# Patient Record
Sex: MALE | Race: WHITE | NOT HISPANIC OR LATINO
[De-identification: names, ages, dates, MRNs, and addresses within clinical notes are randomized per-mention and may not be internally consistent; named-entity substitution may affect disease eponyms.]

---

## 2021-07-12 ENCOUNTER — APPOINTMENT (OUTPATIENT)
Dept: INTERNAL MEDICINE | Facility: CLINIC | Age: 21
End: 2021-07-12
Payer: COMMERCIAL

## 2021-07-12 ENCOUNTER — LABORATORY RESULT (OUTPATIENT)
Age: 21
End: 2021-07-12

## 2021-07-12 VITALS
DIASTOLIC BLOOD PRESSURE: 72 MMHG | HEIGHT: 71 IN | WEIGHT: 153 LBS | BODY MASS INDEX: 21.42 KG/M2 | SYSTOLIC BLOOD PRESSURE: 138 MMHG

## 2021-07-12 DIAGNOSIS — Z13.6 ENCOUNTER FOR SCREENING FOR CARDIOVASCULAR DISORDERS: ICD-10-CM

## 2021-07-12 DIAGNOSIS — Z00.00 ENCOUNTER FOR GENERAL ADULT MEDICAL EXAMINATION W/OUT ABNORMAL FINDINGS: ICD-10-CM

## 2021-07-12 PROCEDURE — 99395 PREV VISIT EST AGE 18-39: CPT | Mod: 25

## 2021-07-12 NOTE — REVIEW OF SYSTEMS
[Negative] : Heme/Lymph [Fever] : no fever [Chills] : no chills [Vision Problems] : no vision problems [Hearing Loss] : no hearing loss [Chest Pain] : no chest pain [Palpitations] : no palpitations [Shortness Of Breath] : no shortness of breath [Wheezing] : no wheezing [Dyspnea on Exertion] : not dyspnea on exertion [Abdominal Pain] : no abdominal pain [Muscle Weakness] : no muscle weakness [Joint Swelling] : no joint swelling [Headache] : no headache [Dizziness] : no dizziness [Fainting] : no fainting

## 2021-07-12 NOTE — HEALTH RISK ASSESSMENT
[No] : In the past 12 months have you used drugs other than those required for medical reasons? No [0] : 2) Feeling down, depressed, or hopeless: Not at all (0) [PHQ-2 Negative - No further assessment needed] : PHQ-2 Negative - No further assessment needed [] : No [MLL1Pommh] : 0

## 2021-07-12 NOTE — ASSESSMENT
[FreeTextEntry1] : His history and exam are unremarkable.\par Fasting labs are sent.\par His forms for participation were completed.

## 2021-07-12 NOTE — PHYSICAL EXAM
[No Acute Distress] : no acute distress [Clear to Auscultation] : lungs were clear to auscultation bilaterally [Normal] : normal rate, regular rhythm, normal S1 and S2 and no murmur heard [No Edema] : there was no peripheral edema [No Joint Swelling] : no joint swelling [No Focal Deficits] : no focal deficits [Normal Affect] : the affect was normal [Alert and Oriented x3] : oriented to person, place, and time

## 2021-07-12 NOTE — HISTORY OF PRESENT ILLNESS
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  Denies any recent illness and has been well.\par \par Denies any history of chest pain, palpitations, shortness of breath, dizziness or syncope.  He exercises regularly without any difficulty.

## 2021-07-13 ENCOUNTER — APPOINTMENT (OUTPATIENT)
Dept: INTERNAL MEDICINE | Facility: CLINIC | Age: 21
End: 2021-07-13
Payer: COMMERCIAL

## 2021-07-13 VITALS
TEMPERATURE: 98.8 F | HEIGHT: 71 IN | SYSTOLIC BLOOD PRESSURE: 124 MMHG | BODY MASS INDEX: 21.42 KG/M2 | WEIGHT: 153 LBS | DIASTOLIC BLOOD PRESSURE: 72 MMHG

## 2021-07-13 DIAGNOSIS — R50.9 FEVER, UNSPECIFIED: ICD-10-CM

## 2021-07-13 LAB
ALBUMIN SERPL ELPH-MCNC: 4.6 G/DL
ALP BLD-CCNC: 58 U/L
ALT SERPL-CCNC: 77 U/L
ANION GAP SERPL CALC-SCNC: 13 MMOL/L
AST SERPL-CCNC: 67 U/L
BILIRUB SERPL-MCNC: 0.7 MG/DL
BUN SERPL-MCNC: 13 MG/DL
CALCIUM SERPL-MCNC: 9.5 MG/DL
CHLORIDE SERPL-SCNC: 101 MMOL/L
CHOLEST SERPL-MCNC: 154 MG/DL
CO2 SERPL-SCNC: 24 MMOL/L
CREAT SERPL-MCNC: 1.06 MG/DL
GLUCOSE SERPL-MCNC: 110 MG/DL
HDLC SERPL-MCNC: 39 MG/DL
LDLC SERPL CALC-MCNC: 92 MG/DL
NONHDLC SERPL-MCNC: 114 MG/DL
POTASSIUM SERPL-SCNC: 4.4 MMOL/L
PROT SERPL-MCNC: 6.9 G/DL
SODIUM SERPL-SCNC: 138 MMOL/L
TRIGL SERPL-MCNC: 112 MG/DL

## 2021-07-13 PROCEDURE — 99213 OFFICE O/P EST LOW 20 MIN: CPT | Mod: 25

## 2021-07-13 PROCEDURE — 87880 STREP A ASSAY W/OPTIC: CPT | Mod: QW

## 2021-07-13 NOTE — HISTORY OF PRESENT ILLNESS
[de-identified] : 21 y/o presents c/o swelling on the left side of his neck this morning associated with fever to 102.  His fever improved with ibuprofen.  He denies any sore throat or ear pain.

## 2021-07-13 NOTE — ASSESSMENT
[FreeTextEntry1] : Lymphadenopathy/fever–his strep test was negative and his lab work from yesterday revealed a WBC of 8.77 with a slight lymphocytosis and slightly elevated liver enzymes.  He was told it is possible he could have mononucleosis or possibly some other viral infection.  Additional labs were ordered to check for mono.  For now he is going to rest, drink plenty of fluids and use Advil or Tylenol as needed for fever and discomfort.

## 2021-07-13 NOTE — REVIEW OF SYSTEMS
[Fever] : fever [Chills] : chills [Earache] : no earache [Sore Throat] : no sore throat [Chest Pain] : no chest pain [Shortness Of Breath] : no shortness of breath [Cough] : no cough [Abdominal Pain] : no abdominal pain [Nausea] : no nausea [Vomiting] : no vomiting [Joint Pain] : no joint pain [Skin Rash] : no skin rash [Headache] : no headache

## 2021-07-13 NOTE — PHYSICAL EXAM
[No Acute Distress] : no acute distress [Normal Oropharynx] : the oropharynx was normal [No Respiratory Distress] : no respiratory distress  [Normal Rate] : normal rate  [Regular Rhythm] : with a regular rhythm [de-identified] : Pharynx is clear without any swelling. [de-identified] : Left-sided tender anterior and posterior cervical nodes.

## 2021-07-14 LAB
BASOPHILS # BLD AUTO: 0.08 K/UL
BASOPHILS NFR BLD AUTO: 0.9 %
EOSINOPHIL # BLD AUTO: 0 K/UL
EOSINOPHIL NFR BLD AUTO: 0 %
HCT VFR BLD CALC: 46.2 %
HGB BLD-MCNC: 15.2 G/DL
LYMPHOCYTES # BLD AUTO: 3.9 K/UL
LYMPHOCYTES NFR BLD AUTO: 44.5 %
MAN DIFF?: NORMAL
MCHC RBC-ENTMCNC: 29 PG
MCHC RBC-ENTMCNC: 32.9 GM/DL
MCV RBC AUTO: 88 FL
MONOCYTES # BLD AUTO: 0.89 K/UL
MONOCYTES NFR BLD AUTO: 10.2 %
NEUTROPHILS # BLD AUTO: 3.49 K/UL
NEUTROPHILS NFR BLD AUTO: 39.8 %
PLATELET # BLD AUTO: 229 K/UL
RBC # BLD: 5.25 M/UL
RBC # FLD: 12.8 %
WBC # FLD AUTO: 8.77 K/UL

## 2021-07-15 LAB — HETEROPH AB SER QL: POSITIVE

## 2021-07-19 DIAGNOSIS — J02.9 ACUTE PHARYNGITIS, UNSPECIFIED: ICD-10-CM

## 2021-07-30 ENCOUNTER — INPATIENT (INPATIENT)
Facility: HOSPITAL | Age: 21
LOS: 2 days | Discharge: HOME | End: 2021-08-02
Attending: OTOLARYNGOLOGY | Admitting: OTOLARYNGOLOGY
Payer: COMMERCIAL

## 2021-07-30 ENCOUNTER — APPOINTMENT (OUTPATIENT)
Dept: INTERNAL MEDICINE | Facility: CLINIC | Age: 21
End: 2021-07-30

## 2021-07-30 ENCOUNTER — EMERGENCY (EMERGENCY)
Facility: HOSPITAL | Age: 21
LOS: 0 days | Discharge: ANOTHER HOSPITAL | End: 2021-07-30
Attending: EMERGENCY MEDICINE
Payer: COMMERCIAL

## 2021-07-30 VITALS
SYSTOLIC BLOOD PRESSURE: 123 MMHG | RESPIRATION RATE: 18 BRPM | HEART RATE: 89 BPM | TEMPERATURE: 98 F | DIASTOLIC BLOOD PRESSURE: 74 MMHG | OXYGEN SATURATION: 100 %

## 2021-07-30 VITALS — WEIGHT: 184.97 LBS | HEIGHT: 72 IN

## 2021-07-30 VITALS
TEMPERATURE: 98 F | OXYGEN SATURATION: 98 % | HEART RATE: 89 BPM | HEIGHT: 72 IN | RESPIRATION RATE: 18 BRPM | DIASTOLIC BLOOD PRESSURE: 77 MMHG | SYSTOLIC BLOOD PRESSURE: 119 MMHG

## 2021-07-30 DIAGNOSIS — Z20.822 CONTACT WITH AND (SUSPECTED) EXPOSURE TO COVID-19: ICD-10-CM

## 2021-07-30 DIAGNOSIS — M54.2 CERVICALGIA: ICD-10-CM

## 2021-07-30 DIAGNOSIS — L02.11 CUTANEOUS ABSCESS OF NECK: ICD-10-CM

## 2021-07-30 LAB
ALBUMIN SERPL ELPH-MCNC: 3.8 G/DL — SIGNIFICANT CHANGE UP (ref 3.3–5)
ALP SERPL-CCNC: 112 U/L — SIGNIFICANT CHANGE UP (ref 40–120)
ALT FLD-CCNC: 59 U/L — SIGNIFICANT CHANGE UP (ref 12–78)
ANION GAP SERPL CALC-SCNC: 5 MMOL/L — SIGNIFICANT CHANGE UP (ref 5–17)
AST SERPL-CCNC: 15 U/L — SIGNIFICANT CHANGE UP (ref 15–37)
BASOPHILS # BLD AUTO: 0.04 K/UL — SIGNIFICANT CHANGE UP (ref 0–0.2)
BASOPHILS NFR BLD AUTO: 0.2 % — SIGNIFICANT CHANGE UP (ref 0–2)
BILIRUB SERPL-MCNC: 1.2 MG/DL — SIGNIFICANT CHANGE UP (ref 0.2–1.2)
BUN SERPL-MCNC: 12 MG/DL — SIGNIFICANT CHANGE UP (ref 7–23)
CALCIUM SERPL-MCNC: 9.7 MG/DL — SIGNIFICANT CHANGE UP (ref 8.5–10.1)
CHLORIDE SERPL-SCNC: 105 MMOL/L — SIGNIFICANT CHANGE UP (ref 96–108)
CO2 SERPL-SCNC: 26 MMOL/L — SIGNIFICANT CHANGE UP (ref 22–31)
CREAT SERPL-MCNC: 0.91 MG/DL — SIGNIFICANT CHANGE UP (ref 0.5–1.3)
EOSINOPHIL # BLD AUTO: 0.01 K/UL — SIGNIFICANT CHANGE UP (ref 0–0.5)
EOSINOPHIL NFR BLD AUTO: 0.1 % — SIGNIFICANT CHANGE UP (ref 0–6)
GLUCOSE SERPL-MCNC: 111 MG/DL — HIGH (ref 70–99)
HCT VFR BLD CALC: 42.9 % — SIGNIFICANT CHANGE UP (ref 39–50)
HGB BLD-MCNC: 14.3 G/DL — SIGNIFICANT CHANGE UP (ref 13–17)
IMM GRANULOCYTES NFR BLD AUTO: 0.4 % — SIGNIFICANT CHANGE UP (ref 0–1.5)
LYMPHOCYTES # BLD AUTO: 11.9 % — LOW (ref 13–44)
LYMPHOCYTES # BLD AUTO: 2 K/UL — SIGNIFICANT CHANGE UP (ref 1–3.3)
MCHC RBC-ENTMCNC: 27.6 PG — SIGNIFICANT CHANGE UP (ref 27–34)
MCHC RBC-ENTMCNC: 33.3 GM/DL — SIGNIFICANT CHANGE UP (ref 32–36)
MCV RBC AUTO: 82.8 FL — SIGNIFICANT CHANGE UP (ref 80–100)
MONOCYTES # BLD AUTO: 1.32 K/UL — HIGH (ref 0–0.9)
MONOCYTES NFR BLD AUTO: 7.8 % — SIGNIFICANT CHANGE UP (ref 2–14)
NEUTROPHILS # BLD AUTO: 13.4 K/UL — HIGH (ref 1.8–7.4)
NEUTROPHILS NFR BLD AUTO: 79.6 % — HIGH (ref 43–77)
PLATELET # BLD AUTO: 414 K/UL — HIGH (ref 150–400)
POTASSIUM SERPL-MCNC: 4 MMOL/L — SIGNIFICANT CHANGE UP (ref 3.5–5.3)
POTASSIUM SERPL-SCNC: 4 MMOL/L — SIGNIFICANT CHANGE UP (ref 3.5–5.3)
PROT SERPL-MCNC: 8.4 GM/DL — HIGH (ref 6–8.3)
RBC # BLD: 5.18 M/UL — SIGNIFICANT CHANGE UP (ref 4.2–5.8)
RBC # FLD: 11.9 % — SIGNIFICANT CHANGE UP (ref 10.3–14.5)
SARS-COV-2 RNA SPEC QL NAA+PROBE: SIGNIFICANT CHANGE UP
SODIUM SERPL-SCNC: 136 MMOL/L — SIGNIFICANT CHANGE UP (ref 135–145)
WBC # BLD: 16.83 K/UL — HIGH (ref 3.8–10.5)
WBC # FLD AUTO: 16.83 K/UL — HIGH (ref 3.8–10.5)

## 2021-07-30 PROCEDURE — U0003: CPT

## 2021-07-30 PROCEDURE — 80053 COMPREHEN METABOLIC PANEL: CPT

## 2021-07-30 PROCEDURE — G1004: CPT

## 2021-07-30 PROCEDURE — 96374 THER/PROPH/DIAG INJ IV PUSH: CPT

## 2021-07-30 PROCEDURE — 99285 EMERGENCY DEPT VISIT HI MDM: CPT

## 2021-07-30 PROCEDURE — U0005: CPT

## 2021-07-30 PROCEDURE — 87040 BLOOD CULTURE FOR BACTERIA: CPT

## 2021-07-30 PROCEDURE — 36415 COLL VENOUS BLD VENIPUNCTURE: CPT

## 2021-07-30 PROCEDURE — 99285 EMERGENCY DEPT VISIT HI MDM: CPT | Mod: 25

## 2021-07-30 PROCEDURE — 70491 CT SOFT TISSUE NECK W/DYE: CPT | Mod: 26,MG

## 2021-07-30 PROCEDURE — 70491 CT SOFT TISSUE NECK W/DYE: CPT

## 2021-07-30 PROCEDURE — 96375 TX/PRO/DX INJ NEW DRUG ADDON: CPT

## 2021-07-30 PROCEDURE — 85025 COMPLETE CBC W/AUTO DIFF WBC: CPT

## 2021-07-30 RX ORDER — SODIUM CHLORIDE 9 MG/ML
1000 INJECTION INTRAMUSCULAR; INTRAVENOUS; SUBCUTANEOUS ONCE
Refills: 0 | Status: COMPLETED | OUTPATIENT
Start: 2021-07-30 | End: 2021-07-30

## 2021-07-30 RX ORDER — CEFAZOLIN SODIUM 1 G
2000 VIAL (EA) INJECTION ONCE
Refills: 0 | Status: COMPLETED | OUTPATIENT
Start: 2021-07-30 | End: 2021-07-30

## 2021-07-30 RX ORDER — ACETAMINOPHEN 500 MG
1000 TABLET ORAL ONCE
Refills: 0 | Status: COMPLETED | OUTPATIENT
Start: 2021-07-30 | End: 2021-07-30

## 2021-07-30 RX ORDER — KETOROLAC TROMETHAMINE 30 MG/ML
15 SYRINGE (ML) INJECTION ONCE
Refills: 0 | Status: DISCONTINUED | OUTPATIENT
Start: 2021-07-30 | End: 2021-07-30

## 2021-07-30 RX ORDER — DEXAMETHASONE 0.5 MG/5ML
10 ELIXIR ORAL ONCE
Refills: 0 | Status: COMPLETED | OUTPATIENT
Start: 2021-07-30 | End: 2021-07-30

## 2021-07-30 RX ADMIN — Medication 400 MILLIGRAM(S): at 17:24

## 2021-07-30 RX ADMIN — SODIUM CHLORIDE 1000 MILLILITER(S): 9 INJECTION INTRAMUSCULAR; INTRAVENOUS; SUBCUTANEOUS at 14:28

## 2021-07-30 RX ADMIN — Medication 100 MILLIGRAM(S): at 16:09

## 2021-07-30 RX ADMIN — Medication 15 MILLIGRAM(S): at 14:28

## 2021-07-30 RX ADMIN — Medication 102 MILLIGRAM(S): at 15:55

## 2021-07-30 RX ADMIN — Medication 15 MILLIGRAM(S): at 23:50

## 2021-07-30 NOTE — ED STATDOCS - ATTENDING CONTRIBUTION TO CARE
I, Bennie Morris MD,  performed the initial face to face bedside interview with this patient regarding history of present illness, review of symptoms and relevant past medical, social and family history.  I completed an independent physical examination.  I was the initial provider who evaluated this patient. I have signed out the follow up of any pending tests (i.e. labs, radiological studies) to the resident.  I have communicated the patient’s plan of care and disposition with the resident.

## 2021-07-30 NOTE — ED ADULT TRIAGE NOTE - CCCP TRG CHIEF CMPLNT
left side neck swelling and discomfort brought from [facility] for ENT eval/pain, neck left side neck swelling and discomfort transfer from [facility] for ENT eval/pain, neck

## 2021-07-30 NOTE — ED STATDOCS - DISPOSITION TYPE
Spoke to mother who stated that Zeynep has not had her period for 2 months now. Last year, she did not have a period for 6 months. At last [facility], mother requested lab work to be ordered, but mother was advised to have labs ordered by rheumatology. Rheum did not order labs at the time of visit. Mother states that Zeynep does have periods of tiredness and fatigue. She did have these periods in the past and it improved slightly with DBT. Is not dizzy, but does state that energy levels are decreased. Advised follow up to discuss lab work being checked, referral for ob gyn may be needed as well. Apt made for 2/22/21 with PCP. Educated on red flags when to call back for quicker apt. Mother agreeable to plan and verbalized understanding.   
TRANSFER

## 2021-07-30 NOTE — ED STATDOCS - PHYSICAL EXAMINATION
Gen: non toxic appearing, NAD   Head: NC/NT  Eyes: PERRL, EOMI   ENT: airway patent, mmm, oral cavity and pharynx normal. No inflammation, swelling, exudate, or lesions.   NECK: +abt 6*3cm swelling/erythema of LT side of the neck, no mastoid ttp  CV: RRR, +S1/S2, no M/R/G, symmetric pulses   Resp: CTAB, symmetric breath sounds   GI:  abdomen soft non-distended, NTTP  Extremities - FROM   Neuro: A&Ox4, following commands, speech clear, moving all four extremities spontaneously

## 2021-07-30 NOTE — ED STATDOCS - NS_ ATTENDINGSCRIBEDETAILS _ED_A_ED_FT
I, Bennie Morris MD,  performed the initial face to face bedside interview with this patient regarding history of present illness, review of symptoms and relevant past medical, social and family history.  I completed an independent physical examination.    The history, relevant review of systems, past medical and surgical history, medical decision making, and physical examination was documented by the scribe in my presence and I attest to the accuracy of the documentation.

## 2021-07-30 NOTE — ED ADULT TRIAGE NOTE - CHIEF COMPLAINT QUOTE
Patient comes to ED for left neck lymph swelling. pt was diagnosed with mono 2 1/2 weeks ago. patient had fevers last week. patient complaining of painful left lymph. pt denies any respiratory distress. able to tolerate eating and drinking with minimal pain

## 2021-07-30 NOTE — ED ADULT NURSE NOTE - OBJECTIVE STATEMENT
presents to ed with inflamed left neck pain. patient was seen 3 weeks ago and treated with steroids for same, patient was positive for mono.

## 2021-07-30 NOTE — ED STATDOCS - PROGRESS NOTE DETAILS
Bin Blunt for attending Dr. Morris: 21 y/o male with no significant PMHx presents to the ED c/o left neck lymph node swelling x3 days. Pt was diagnosed with mono 3 weeks ago. Completed Prednisone 1 week ago. Pt sent to the ED by PMD Dr. Philip Grey. Pt able to swallow. No other complaints at this time. Exam with left neck swelling. Mass easily palpable and TTP. Pt diaphoretic. Oropharynx erythematous. No exudate. Agree with resident plan and management. Lily Pacheco DO PGY-3: discussed case with ENT - Dr. Mckenzie - would like the patient to be transferred to [facility] ED for assessment. Pt continues to be stable. No drooling/controlling his secretions.

## 2021-07-30 NOTE — ED STATDOCS - OBJECTIVE STATEMENT
19yo M with no pmhx presents with pain and swelling in the LT side of his neck for past couple of days. Dx with Mono 2.5weeks ago. Had this swelling when sxs initially started - mild form - took prednisone for abt 1 week, finished one week ago. Sxs improved at that time. Able to control his secretions. Eating and drinking normally with discomfort in his neck. Fever and chills this morning.

## 2021-07-30 NOTE — ED ADULT TRIAGE NOTE - CHIEF COMPLAINT QUOTE
zero air way involvement at this time respiration even unlaboured had (prednisone and toradol & iv tylenol )#20 right a/c

## 2021-07-30 NOTE — ED ADULT NURSE NOTE - OBJECTIVE STATEMENT
Pt received in spot 27, A&Ox4, NAD.  c/o transfer from [place] ED for L sided neck abscess.  Pt denies any fevers/chills.  Denies any difficulty drinking, swallowing or breathing, but endorses difficulty speaking at times.  Respirations even and unlabored on room air.  No signs of compromised airway, no drooling.  Speaking in clear full sentences.  Currently being treated for mono for the past 3 weeks.  Arrives with 20G IVL to R AC from [facility].  Awaiting MD ramos.  Will continue to monitor.

## 2021-07-30 NOTE — ED STATDOCS - NS ED ROS FT
Gen: Denies fever, chills  CV: Denies chest pain  Skin: +redness and swelling of LT neck  Resp: Denies SOB, cough  ENT: Denies nasal congestion  Eyes: Denies blurry vision  GI: Denies nausea, vomiting   Msk: Denies LE swelling  : Denies dysuria

## 2021-07-31 DIAGNOSIS — L02.11 CUTANEOUS ABSCESS OF NECK: ICD-10-CM

## 2021-07-31 LAB
HCT VFR BLD CALC: 40.2 % — SIGNIFICANT CHANGE UP (ref 39–50)
HGB BLD-MCNC: 13.7 G/DL — SIGNIFICANT CHANGE UP (ref 13–17)
MCHC RBC-ENTMCNC: 28 PG — SIGNIFICANT CHANGE UP (ref 27–34)
MCHC RBC-ENTMCNC: 34.1 GM/DL — SIGNIFICANT CHANGE UP (ref 32–36)
MCV RBC AUTO: 82.2 FL — SIGNIFICANT CHANGE UP (ref 80–100)
NRBC # BLD: 0 /100 WBCS — SIGNIFICANT CHANGE UP
NRBC # FLD: 0 K/UL — SIGNIFICANT CHANGE UP
PLATELET # BLD AUTO: 416 K/UL — HIGH (ref 150–400)
RBC # BLD: 4.89 M/UL — SIGNIFICANT CHANGE UP (ref 4.2–5.8)
RBC # FLD: 11.9 % — SIGNIFICANT CHANGE UP (ref 10.3–14.5)
WBC # BLD: 18.94 K/UL — HIGH (ref 3.8–10.5)
WBC # FLD AUTO: 18.94 K/UL — HIGH (ref 3.8–10.5)

## 2021-07-31 RX ORDER — KETOROLAC TROMETHAMINE 30 MG/ML
30 SYRINGE (ML) INJECTION EVERY 6 HOURS
Refills: 0 | Status: DISCONTINUED | OUTPATIENT
Start: 2021-07-31 | End: 2021-08-02

## 2021-07-31 RX ORDER — DEXAMETHASONE 0.5 MG/5ML
10 ELIXIR ORAL EVERY 8 HOURS
Refills: 0 | Status: COMPLETED | OUTPATIENT
Start: 2021-07-31 | End: 2021-08-01

## 2021-07-31 RX ORDER — ACETAMINOPHEN 500 MG
650 TABLET ORAL EVERY 4 HOURS
Refills: 0 | Status: DISCONTINUED | OUTPATIENT
Start: 2021-07-31 | End: 2021-08-02

## 2021-07-31 RX ORDER — HEPARIN SODIUM 5000 [USP'U]/ML
5000 INJECTION INTRAVENOUS; SUBCUTANEOUS EVERY 8 HOURS
Refills: 0 | Status: DISCONTINUED | OUTPATIENT
Start: 2021-07-31 | End: 2021-08-01

## 2021-07-31 RX ORDER — OXYCODONE HYDROCHLORIDE 5 MG/1
5 TABLET ORAL EVERY 6 HOURS
Refills: 0 | Status: DISCONTINUED | OUTPATIENT
Start: 2021-07-31 | End: 2021-08-02

## 2021-07-31 RX ADMIN — Medication 30 MILLIGRAM(S): at 11:10

## 2021-07-31 RX ADMIN — Medication 102 MILLIGRAM(S): at 18:54

## 2021-07-31 RX ADMIN — Medication 100 MILLIGRAM(S): at 01:41

## 2021-07-31 RX ADMIN — Medication 100 MILLIGRAM(S): at 10:29

## 2021-07-31 RX ADMIN — Medication 30 MILLIGRAM(S): at 10:30

## 2021-07-31 RX ADMIN — Medication 102 MILLIGRAM(S): at 10:29

## 2021-07-31 RX ADMIN — Medication 100 MILLIGRAM(S): at 18:15

## 2021-07-31 RX ADMIN — Medication 102 MILLIGRAM(S): at 02:36

## 2021-07-31 RX ADMIN — Medication 15 MILLIGRAM(S): at 01:55

## 2021-07-31 NOTE — PROVIDER CONTACT NOTE (MEDICATION) - ACTION/TREATMENT ORDERED:
Team made aware, patient aware of risks, will continue to have pt ambulate, will continue to monitor.

## 2021-07-31 NOTE — ED ADULT NURSE REASSESSMENT NOTE - NS ED NURSE REASSESS COMMENT FT1
Pt A&OX4, NAD.  States feels much better after pain medication.  Tolerating PO solids without difficulty.  Appears comfortable.  Awaiting room assignment. Will continue to monitor.

## 2021-07-31 NOTE — H&P ADULT - ASSESSMENT
A/P: 20M w/ hx of mono 3 weeks ago is presenting with worsening L neck swelling/pain - CT scan at [facility] showing possible collection 4.1cm. WBC = 16.8, actively having fevers (101 before arriving to ED). Likely evolving phlegmon vs. neck abscess - no trial of antibiotics at this point with no airway or swallowing symptoms.    - Admit to ENT  - Clindamycin q8 hrs  - Decadron q8 for 2 days  - Regular diet  - SCDs  - F/u AM CBC

## 2021-07-31 NOTE — PROVIDER CONTACT NOTE (MEDICATION) - ASSESSMENT
Pt remained stable, resting comfortably and ambulating throughout shift. No complaints of pain. Tolerating diet well.

## 2021-07-31 NOTE — ED PROVIDER NOTE - OBJECTIVE STATEMENT
21yo M with no PMHX sent to [facility] as transfer from [place] for left anterior neck abscess 4cm. PAtient was diagnosed with infectious mono 3 weeks ago, been taking po prednisone with no relief (no antibx used outpatient), went to [place] ED, had CT scan showing abscess so sent to [facility]. No fevers, sob, drooling, sore throat, or other complaints.

## 2021-07-31 NOTE — ED PROVIDER NOTE - CCCP TRG CHIEF CMPLNT
left side neck swelling and discomfort transfer from [facility] for ENT eval/pain, neck

## 2021-07-31 NOTE — ED PROVIDER NOTE - PROGRESS NOTE DETAILS
Dr. Gary Ozuna DO (ED ATTENDING):  ENT will admit with IV antibx and decadron and observe. pt ok with plan of care

## 2021-07-31 NOTE — ED PROVIDER NOTE - CLINICAL SUMMARY MEDICAL DECISION MAKING FREE TEXT BOX
21yo M with no PMHX sent to [facility] as transfer from [place] for left anterior neck abscess 4cm. PAtient was diagnosed with infectious mono 3 weeks ago, been taking po prednisone with no relief (no antibx used outpatient), went to [place] ED, had CT scan showing abscess so sent to [facility]. No fevers, sob, drooling, sore throat, or other complaints. EXAM AS ABOVE. a/p neck abscess. no acute airway issues in Ed. will consult ent and dispo per them

## 2021-07-31 NOTE — ED PROVIDER NOTE - PHYSICAL EXAMINATION
General: Patient in no apparent distress, AAO x 3  Skin: Dry and intact  HEENT: Head atraumatic. Oral mucosa moist. No pharyngeal exudates or tonsillar enlargement. + left neck mass with ttp and fluctuance with no overlying skin changes   Eyes: Conjunctiva normal  Cardiac: Regular rhythm and rate. No pretibial edema b/l  Respiratory: Lungs clear b/l and symmetric. No respiratory distress. Able to speak in complete sentences.  Gastrointestinal: Abdomen soft, nondistended, nontender  Musculoskeletal: Moves all extremities spontaneously  Neurological: alert and oriented to person, place, and time  Psychiatric: Cooperative

## 2021-07-31 NOTE — ED PROVIDER NOTE - NS ED ROS FT
Constitutional: No weakness or fatigue, no fevers or chills  Skin: No rash or pruritis  HEENT: No sore throat. +neck pain  Cardiovascular: No chest pain, no shortness of breath  Respiratory: No shortness of breath, no cough  Gastrointestinal: No abdominal pain, no nausea, no vomiting, no diarrhea, no constipation  Musculoskeletal: No joint pain  Genitourinary: No dysuria or hematuria  Neurological: No focal weakness or tingling

## 2021-07-31 NOTE — H&P ADULT - HISTORY OF PRESENT ILLNESS
H&P ENT    HPI: 20M w/ hx of mono 3 weeks ago is presenting with worsening L neck swelling/pain. Three weeks ago, pt was diagnosed with mono when he was having sore throat/odynophagia/fatigue/fevers. He noticed a L side neck swelling that was intermittently painful but primary symptoms were in throat. Pt was started on steroids the 2nd week of his infection for a 1 week course. By the end of the course, pt's mono symptoms were almost entirely resolved and he had no more L neck swelling. Three days ago, neck swelling returned with worsening pain and frequent fevers. Pt otherwise never had issues breathing or swallowing. He came to ED because he could no longer manage the pain with tylenol and motrin - Pt was transferred here when CT read 4.1 cm possible abscess. Otherwise denies current thraot pain, drooling or SOB.    PAST MEDICAL & SURGICAL HISTORY:  No pertinent past medical history      No Known Allergies    MEDICATIONS  (STANDING):  clindamycin IVPB 900 milliGRAM(s) IV Intermittent every 8 hours  dexAMETHasone  IVPB 10 milliGRAM(s) IV Intermittent every 8 hours    MEDICATIONS  (PRN):  acetaminophen   Tablet .. 650 milliGRAM(s) Oral every 4 hours PRN Mild Pain (1 - 3)  ketorolac   Injectable 30 milliGRAM(s) IV Push every 6 hours PRN Moderate Pain (4 - 6)  oxyCODONE    IR 5 milliGRAM(s) Oral every 6 hours PRN Severe Pain (7 - 10)      Objective    ICU Vital Signs Last 24 Hrs  T(C): 37.1 (31 Jul 2021 00:37), Max: 38.4 (30 Jul 2021 13:43)  T(F): 98.7 (31 Jul 2021 00:37), Max: 101.1 (30 Jul 2021 13:43)  HR: 97 (31 Jul 2021 00:37) (79 - 110)  BP: 134/84 (31 Jul 2021 00:37) (117/68 - 135/87)  BP(mean): 100 (30 Jul 2021 13:43) (100 - 100)  ABP: --  ABP(mean): --  RR: 16 (31 Jul 2021 00:37) (16 - 19)  SpO2: 100% (31 Jul 2021 00:37) (98% - 100%)      PHYSICAL EXAM:    NOSE: Normal external nose. Anterior nasal cavity patent with no obstruction. Inferior turbinates normally sized.  ORAL CAVITY/OROPHARYNX: Mild erythema of tonsils, no swelling or exudate. No pus from palpation of parotid.  NECK: L neck swelling in inferior auricular area at angle of jaw extending to posterior submandibular region. Decreased ROM to R for neck. No trismus. Mild overlying erythema. No fluctuance.  RESPIRATORY: Respirations unlabored, no increased work of breathing with use of accessory muscles and retractions. No stridor.  CARDIAC: Warm extremities, no cyanosis.                           14.3   16.83 )-----------( 414      ( 30 Jul 2021 14:21 )             42.9     07-30    136  |  105  |  12  ----------------------------<  111<H>  4.0   |  26  |  0.91    Ca    9.7      30 Jul 2021 14:21    TPro  8.4<H>  /  Alb  3.8  /  TBili  1.2  /  DBili  x   /  AST  15  /  ALT  59  /  AlkPhos  112  07-30      I&O's Summary

## 2021-08-01 LAB
HCT VFR BLD CALC: 40.3 % — SIGNIFICANT CHANGE UP (ref 39–50)
HGB BLD-MCNC: 13.5 G/DL — SIGNIFICANT CHANGE UP (ref 13–17)
MCHC RBC-ENTMCNC: 28.1 PG — SIGNIFICANT CHANGE UP (ref 27–34)
MCHC RBC-ENTMCNC: 33.5 GM/DL — SIGNIFICANT CHANGE UP (ref 32–36)
MCV RBC AUTO: 84 FL — SIGNIFICANT CHANGE UP (ref 80–100)
NRBC # BLD: 0 /100 WBCS — SIGNIFICANT CHANGE UP
NRBC # FLD: 0 K/UL — SIGNIFICANT CHANGE UP
PLATELET # BLD AUTO: 488 K/UL — HIGH (ref 150–400)
RBC # BLD: 4.8 M/UL — SIGNIFICANT CHANGE UP (ref 4.2–5.8)
RBC # FLD: 12.1 % — SIGNIFICANT CHANGE UP (ref 10.3–14.5)
WBC # BLD: 18.81 K/UL — HIGH (ref 3.8–10.5)
WBC # FLD AUTO: 18.81 K/UL — HIGH (ref 3.8–10.5)

## 2021-08-01 RX ADMIN — Medication 102 MILLIGRAM(S): at 18:19

## 2021-08-01 RX ADMIN — Medication 102 MILLIGRAM(S): at 09:29

## 2021-08-01 RX ADMIN — Medication 100 MILLIGRAM(S): at 17:48

## 2021-08-01 RX ADMIN — Medication 100 MILLIGRAM(S): at 01:25

## 2021-08-01 RX ADMIN — Medication 102 MILLIGRAM(S): at 01:25

## 2021-08-01 RX ADMIN — Medication 30 MILLIGRAM(S): at 02:10

## 2021-08-01 RX ADMIN — Medication 30 MILLIGRAM(S): at 01:55

## 2021-08-01 RX ADMIN — Medication 100 MILLIGRAM(S): at 09:29

## 2021-08-02 ENCOUNTER — TRANSCRIPTION ENCOUNTER (OUTPATIENT)
Age: 21
End: 2021-08-02

## 2021-08-02 VITALS
RESPIRATION RATE: 17 BRPM | OXYGEN SATURATION: 99 % | HEART RATE: 61 BPM | TEMPERATURE: 98 F | SYSTOLIC BLOOD PRESSURE: 93 MMHG | DIASTOLIC BLOOD PRESSURE: 53 MMHG

## 2021-08-02 DIAGNOSIS — L04.0 ACUTE LYMPHADENITIS OF FACE, HEAD AND NECK: ICD-10-CM

## 2021-08-02 LAB
HCT VFR BLD CALC: 39.1 % — SIGNIFICANT CHANGE UP (ref 39–50)
HGB BLD-MCNC: 12.7 G/DL — LOW (ref 13–17)
MCHC RBC-ENTMCNC: 27.5 PG — SIGNIFICANT CHANGE UP (ref 27–34)
MCHC RBC-ENTMCNC: 32.5 GM/DL — SIGNIFICANT CHANGE UP (ref 32–36)
MCV RBC AUTO: 84.6 FL — SIGNIFICANT CHANGE UP (ref 80–100)
NRBC # BLD: 0 /100 WBCS — SIGNIFICANT CHANGE UP
NRBC # FLD: 0 K/UL — SIGNIFICANT CHANGE UP
PLATELET # BLD AUTO: 402 K/UL — HIGH (ref 150–400)
RBC # BLD: 4.62 M/UL — SIGNIFICANT CHANGE UP (ref 4.2–5.8)
RBC # FLD: 12.1 % — SIGNIFICANT CHANGE UP (ref 10.3–14.5)
WBC # BLD: 17.77 K/UL — HIGH (ref 3.8–10.5)
WBC # FLD AUTO: 17.77 K/UL — HIGH (ref 3.8–10.5)

## 2021-08-02 RX ORDER — OXYCODONE HYDROCHLORIDE 5 MG/1
1 TABLET ORAL
Qty: 16 | Refills: 0
Start: 2021-08-02 | End: 2021-08-05

## 2021-08-02 RX ORDER — DEXAMETHASONE 0.5 MG/5ML
10 ELIXIR ORAL ONCE
Refills: 0 | Status: COMPLETED | OUTPATIENT
Start: 2021-08-02 | End: 2021-08-02

## 2021-08-02 RX ADMIN — Medication 100 MILLIGRAM(S): at 02:03

## 2021-08-02 RX ADMIN — Medication 100 MILLIGRAM(S): at 09:30

## 2021-08-02 RX ADMIN — Medication 102 MILLIGRAM(S): at 09:29

## 2021-08-02 RX ADMIN — Medication 650 MILLIGRAM(S): at 02:08

## 2021-08-02 RX ADMIN — Medication 650 MILLIGRAM(S): at 02:38

## 2021-08-02 NOTE — PROGRESS NOTE ADULT - SUBJECTIVE AND OBJECTIVE BOX
ENT Progress Note        Interval:    7/31: No acute events overnight. Tolerating PO, pain controlled, pt subjectively feels swelling has improved.   8/1: NAEON - Feels pain and swelling much improved. Afebrile/hemodynamically stable - tolerating PO.  8/2: No acute events overnight . States feeling much improved and would like to go home.     PAST MEDICAL & SURGICAL HISTORY:  No pertinent past medical history      No Known Allergies    MEDICATIONS  (STANDING):  clindamycin IVPB 900 milliGRAM(s) IV Intermittent every 8 hours  dexAMETHasone  IVPB 10 milliGRAM(s) IV Intermittent every 8 hours  heparin   Injectable 5000 Unit(s) SubCutaneous every 8 hours    MEDICATIONS  (PRN):  acetaminophen   Tablet .. 650 milliGRAM(s) Oral every 4 hours PRN Mild Pain (1 - 3)  ketorolac   Injectable 30 milliGRAM(s) IV Push every 6 hours PRN Moderate Pain (4 - 6)  oxyCODONE    IR 5 milliGRAM(s) Oral every 6 hours PRN Severe Pain (7 - 10)      Objective    ICU Vital Signs Last 24 Hrs  T(C): 36.6 (01 Aug 2021 06:00), Max: 37 (31 Jul 2021 17:04)  T(F): 97.8 (01 Aug 2021 06:00), Max: 98.6 (31 Jul 2021 17:04)  HR: 67 (01 Aug 2021 06:00) (67 - 93)  BP: 100/57 (01 Aug 2021 06:00) (100/57 - 121/77)  BP(mean): --  ABP: --  ABP(mean): --  RR: 18 (01 Aug 2021 06:00) (17 - 18)  SpO2: 99% (01 Aug 2021 06:00) (98% - 100%)      *PHYSICAL EXAM*                          13.7   18.94 )-----------( 416      ( 31 Jul 2021 07:33 )             40.2     07-30    136  |  105  |  12  ----------------------------<  111<H>  4.0   |  26  |  0.91    Ca    9.7      30 Jul 2021 14:21    TPro  8.4<H>  /  Alb  3.8  /  TBili  1.2  /  DBili  x   /  AST  15  /  ALT  59  /  AlkPhos  112  07-30      I&O's Summary    31 Jul 2021 07:01  -  01 Aug 2021 07:00  --------------------------------------------------------  IN: 560 mL / OUT: 0 mL / NET: 560 mL            Vital Signs Last 24 Hrs  T(C): 36.6 (31 Jul 2021 06:30), Max: 38.4 (30 Jul 2021 13:43)  T(F): 97.8 (31 Jul 2021 06:30), Max: 101.1 (30 Jul 2021 13:43)  HR: 90 (31 Jul 2021 06:30) (79 - 110)  BP: 128/84 (31 Jul 2021 06:30) (117/68 - 135/87)  BP(mean): 100 (30 Jul 2021 13:43) (100 - 100)  RR: 18 (31 Jul 2021 06:30) (16 - 19)  SpO2: 100% (31 Jul 2021 06:30) (98% - 100%)    Physical Exam:  Alert, NAD  Indurated swelling at left angle of mandible. No fluctuance noted on exam.   OP/OC: clear   Nonlabored Respirations NASH VEGA        A/P: 20yMale w/ L neck abscess    - finished decadron today   - C/w clindamycin. May transition to PO abx and d/c this AM.   - SCDs  - Regular diet  
ENT Progress Note        Interval:    7/31: No acute events overnight. Tolerating PO, pain controlled, pt subjectively feels swelling has improved.   8/1: NAEON - Feels pain and swelling much improved. Afebrile/hemodynamically stable - tolerating PO.    PAST MEDICAL & SURGICAL HISTORY:  No pertinent past medical history      No Known Allergies    MEDICATIONS  (STANDING):  clindamycin IVPB 900 milliGRAM(s) IV Intermittent every 8 hours  dexAMETHasone  IVPB 10 milliGRAM(s) IV Intermittent every 8 hours  heparin   Injectable 5000 Unit(s) SubCutaneous every 8 hours    MEDICATIONS  (PRN):  acetaminophen   Tablet .. 650 milliGRAM(s) Oral every 4 hours PRN Mild Pain (1 - 3)  ketorolac   Injectable 30 milliGRAM(s) IV Push every 6 hours PRN Moderate Pain (4 - 6)  oxyCODONE    IR 5 milliGRAM(s) Oral every 6 hours PRN Severe Pain (7 - 10)      Objective    ICU Vital Signs Last 24 Hrs  T(C): 36.6 (01 Aug 2021 06:00), Max: 37 (31 Jul 2021 17:04)  T(F): 97.8 (01 Aug 2021 06:00), Max: 98.6 (31 Jul 2021 17:04)  HR: 67 (01 Aug 2021 06:00) (67 - 93)  BP: 100/57 (01 Aug 2021 06:00) (100/57 - 121/77)  BP(mean): --  ABP: --  ABP(mean): --  RR: 18 (01 Aug 2021 06:00) (17 - 18)  SpO2: 99% (01 Aug 2021 06:00) (98% - 100%)      *PHYSICAL EXAM*                          13.7   18.94 )-----------( 416      ( 31 Jul 2021 07:33 )             40.2     07-30    136  |  105  |  12  ----------------------------<  111<H>  4.0   |  26  |  0.91    Ca    9.7      30 Jul 2021 14:21    TPro  8.4<H>  /  Alb  3.8  /  TBili  1.2  /  DBili  x   /  AST  15  /  ALT  59  /  AlkPhos  112  07-30      I&O's Summary    31 Jul 2021 07:01  -  01 Aug 2021 07:00  --------------------------------------------------------  IN: 560 mL / OUT: 0 mL / NET: 560 mL            Vital Signs Last 24 Hrs  T(C): 36.6 (31 Jul 2021 06:30), Max: 38.4 (30 Jul 2021 13:43)  T(F): 97.8 (31 Jul 2021 06:30), Max: 101.1 (30 Jul 2021 13:43)  HR: 90 (31 Jul 2021 06:30) (79 - 110)  BP: 128/84 (31 Jul 2021 06:30) (117/68 - 135/87)  BP(mean): 100 (30 Jul 2021 13:43) (100 - 100)  RR: 18 (31 Jul 2021 06:30) (16 - 19)  SpO2: 100% (31 Jul 2021 06:30) (98% - 100%)    Physical Exam:  Alert, NAD  Indurated swelling at left angle of mandible. No fluctuance noted on exam.   OP/OC: clear   Nonlabored Respirations NASH VEGA        A/P: 20yMale w/ L neck abscess    - C/w decadron for 2 days total  - C/w clindamycin  - SCDs  - Regular diet  
ENT Progress Note        Interval:  No acute events overnight. Tolerating PO, pain controlled, pt subjectively feels swelling has improved.         PAST MEDICAL & SURGICAL HISTORY:  No pertinent past medical history      Allergies    No Known Allergies    Intolerances      MEDICATIONS  (STANDING):  clindamycin IVPB 900 milliGRAM(s) IV Intermittent every 8 hours  dexAMETHasone  IVPB 10 milliGRAM(s) IV Intermittent every 8 hours  heparin   Injectable 5000 Unit(s) SubCutaneous every 8 hours    MEDICATIONS  (PRN):  acetaminophen   Tablet .. 650 milliGRAM(s) Oral every 4 hours PRN Mild Pain (1 - 3)  ketorolac   Injectable 30 milliGRAM(s) IV Push every 6 hours PRN Moderate Pain (4 - 6)  oxyCODONE    IR 5 milliGRAM(s) Oral every 6 hours PRN Severe Pain (7 - 10)        Vital Signs Last 24 Hrs  T(C): 36.6 (31 Jul 2021 06:30), Max: 38.4 (30 Jul 2021 13:43)  T(F): 97.8 (31 Jul 2021 06:30), Max: 101.1 (30 Jul 2021 13:43)  HR: 90 (31 Jul 2021 06:30) (79 - 110)  BP: 128/84 (31 Jul 2021 06:30) (117/68 - 135/87)  BP(mean): 100 (30 Jul 2021 13:43) (100 - 100)  RR: 18 (31 Jul 2021 06:30) (16 - 19)  SpO2: 100% (31 Jul 2021 06:30) (98% - 100%)    Physical Exam:  Alert, NAD  Indurated swelling at left angle of mandible. No fluctuance noted on exam.   OP/OC: clear   Nonlabored Respirations NASH VEGA      07-30-21 @ 07:01  -  07-31-21 @ 07:00  --------------------------------------------------------  IN: 120 mL / OUT: 0 mL / NET: 120 mL                              13.7   18.94 )-----------( 416      ( 31 Jul 2021 07:33 )             40.2    07-30    136  |  105  |  12  ----------------------------<  111<H>  4.0   |  26  |  0.91    Ca    9.7      30 Jul 2021 14:21    TPro  8.4<H>  /  Alb  3.8  /  TBili  1.2  /  DBili  x   /  AST  15  /  ALT  59  /  AlkPhos  112  07-30         A/P: 20yMale      - SCDs  - d/w attending

## 2021-08-02 NOTE — DISCHARGE NOTE PROVIDER - NSDCFUADDINST_GEN_ALL_CORE_FT
- Please call [phone] to schedule follow-up appointment  - Return to ED with worsening neck pain, swelling, fevers, chills  - Complete antibiotics as prescribed

## 2021-08-02 NOTE — DISCHARGE NOTE PROVIDER - HOSPITAL COURSE
Patient with Left neck abscess. S/P I and D. Cultures were sent.... Started on IV clindamycin.... Patient with Left neck abscess. S/P I and D. Cultures were sent. Started on IV clindamycin and IV decadron. Started on regular diet.  Patient was monitored on 8 south and cleared for discharge by Dr. Alvarez on 8/2/21 on oral clindamycin for 10 days. All Prescriptions were sent to patients pharmacy. Patient with Left neck abscess. S/P I and D. Cultures were sent. Started on IV clindamycin and IV decadron. Started on regular diet.  Patient was monitored on 8 south and cleared for discharge by Dr. Alvarez on 8/2/21 on oral clindamycin for 10 days. All Prescriptions were sent to patients pharmacy. .

## 2021-08-02 NOTE — DISCHARGE NOTE PROVIDER - NSDCMRMEDTOKEN_GEN_ALL_CORE_FT
Cleocin HCl 300 mg oral capsule: 1 cap(s) orally every 6 hours   oxyCODONE 5 mg oral tablet: 1 tab(s) orally every 6 hours, As needed, Severe Pain (7 - 10) MDD:4

## 2021-08-02 NOTE — DISCHARGE NOTE NURSING/CASE MANAGEMENT/SOCIAL WORK - PATIENT PORTAL LINK FT
You can access the FollowMyHealth Patient Portal offered by [facility] by registering at the following website: [email or web address]. By joining [email or web address] FollowMyHealth portal, you will also be able to view your health information using other applications (apps) compatible with our system.

## 2021-08-02 NOTE — PROGRESS NOTE ADULT - ASSESSMENT
A/P: 20M w/ hx of mono 3 weeks ago is presenting with worsening L neck swelling/pain - CT scan at [place] showing possible collection 4.1cm. WBC = 16.8, actively having fevers (101 before arriving to ED). Likely evolving phlegmon vs. neck abscess - no trial of antibiotics at this point with no airway or swallowing symptoms.    - Clindamycin q8 hrs  - Decadron q8 for 2 days  - Regular diet  - If patient does not improve, can consider OR drainage   - SCDs  - Regular diet for now   - Discussed with Dr. Alvarez, ENT attending   
A/P: 20M w/ hx of mono 3 weeks ago is presenting with worsening L neck swelling/pain - CT scan at [facility] showing possible collection 4.1cm. WBC = 16.8, actively having fevers (101 before arriving to ED). Likely evolving phlegmon vs. neck abscess - no trial of antibiotics at this point with no airway or swallowing symptoms.    - Clindamycin q8 hrs  - Decadron q8 for 2 days  - Regular diet  - If patient does not improve, can consider OR drainage   - SCDs  - Regular diet for now   - Discussed with Dr. Alvarez, ENT attending   
A/P: 20M w/ hx of mono 3 weeks ago is presenting with worsening L neck swelling/pain - CT scan at [place] showing possible collection 4.1cm. WBC = 16.8, actively having fevers (101 before arriving to ED). Likely evolving phlegmon vs. neck abscess - no trial of antibiotics at this point with no airway or swallowing symptoms.    - Clindamycin q8 hrs  - Decadron q8 for 2 days  - Regular diet  - If patient does not improve, can consider OR drainage   - SCDs  - Regular diet for now   - Discussed with Dr. Alvarez, ENT attending

## 2021-08-02 NOTE — DISCHARGE NOTE PROVIDER - CARE PROVIDER_API CALL
Heriberto Alvarez  OTOLARYNGOLOGY  [address] [place] [address], [address]  [place] [address]  Phone: [phone]  Fax: [phone]  Follow Up Time:

## 2021-08-03 ENCOUNTER — NON-APPOINTMENT (OUTPATIENT)
Age: 21
End: 2021-08-03

## 2021-08-03 PROBLEM — Z78.9 OTHER SPECIFIED HEALTH STATUS: Chronic | Status: ACTIVE | Noted: 2021-07-30

## 2021-08-04 ENCOUNTER — INPATIENT (INPATIENT)
Facility: HOSPITAL | Age: 21
LOS: 1 days | Discharge: HOME | End: 2021-08-06
Attending: OTOLARYNGOLOGY | Admitting: OTOLARYNGOLOGY
Payer: COMMERCIAL

## 2021-08-04 VITALS
RESPIRATION RATE: 16 BRPM | HEIGHT: 72 IN | DIASTOLIC BLOOD PRESSURE: 75 MMHG | TEMPERATURE: 97 F | OXYGEN SATURATION: 100 % | SYSTOLIC BLOOD PRESSURE: 120 MMHG | HEART RATE: 96 BPM

## 2021-08-04 LAB
ALBUMIN SERPL ELPH-MCNC: 4 G/DL — SIGNIFICANT CHANGE UP (ref 3.3–5)
ALP SERPL-CCNC: 93 U/L — SIGNIFICANT CHANGE UP (ref 40–120)
ALT FLD-CCNC: 23 U/L — SIGNIFICANT CHANGE UP (ref 4–41)
ANION GAP SERPL CALC-SCNC: 15 MMOL/L — HIGH (ref 7–14)
AST SERPL-CCNC: 18 U/L — SIGNIFICANT CHANGE UP (ref 4–40)
B PERT DNA SPEC QL NAA+PROBE: SIGNIFICANT CHANGE UP
B PERT+PARAPERT DNA PNL SPEC NAA+PROBE: SIGNIFICANT CHANGE UP
BASOPHILS # BLD AUTO: 0 K/UL — SIGNIFICANT CHANGE UP (ref 0–0.2)
BASOPHILS NFR BLD AUTO: 0 % — SIGNIFICANT CHANGE UP (ref 0–2)
BILIRUB SERPL-MCNC: 0.7 MG/DL — SIGNIFICANT CHANGE UP (ref 0.2–1.2)
BLOOD GAS VENOUS COMPREHENSIVE RESULT: SIGNIFICANT CHANGE UP
BORDETELLA PARAPERTUSSIS (RAPRVP): SIGNIFICANT CHANGE UP
BUN SERPL-MCNC: 16 MG/DL — SIGNIFICANT CHANGE UP (ref 7–23)
C PNEUM DNA SPEC QL NAA+PROBE: SIGNIFICANT CHANGE UP
CALCIUM SERPL-MCNC: 9.8 MG/DL — SIGNIFICANT CHANGE UP (ref 8.4–10.5)
CHLORIDE SERPL-SCNC: 97 MMOL/L — LOW (ref 98–107)
CO2 SERPL-SCNC: 25 MMOL/L — SIGNIFICANT CHANGE UP (ref 22–31)
CREAT SERPL-MCNC: 0.94 MG/DL — SIGNIFICANT CHANGE UP (ref 0.5–1.3)
CULTURE RESULTS: SIGNIFICANT CHANGE UP
EOSINOPHIL # BLD AUTO: 0 K/UL — SIGNIFICANT CHANGE UP (ref 0–0.5)
EOSINOPHIL NFR BLD AUTO: 0 % — SIGNIFICANT CHANGE UP (ref 0–6)
FLUAV SUBTYP SPEC NAA+PROBE: SIGNIFICANT CHANGE UP
FLUBV RNA SPEC QL NAA+PROBE: SIGNIFICANT CHANGE UP
GLUCOSE SERPL-MCNC: 84 MG/DL — SIGNIFICANT CHANGE UP (ref 70–99)
HADV DNA SPEC QL NAA+PROBE: SIGNIFICANT CHANGE UP
HCOV 229E RNA SPEC QL NAA+PROBE: SIGNIFICANT CHANGE UP
HCOV HKU1 RNA SPEC QL NAA+PROBE: SIGNIFICANT CHANGE UP
HCOV NL63 RNA SPEC QL NAA+PROBE: SIGNIFICANT CHANGE UP
HCOV OC43 RNA SPEC QL NAA+PROBE: SIGNIFICANT CHANGE UP
HCT VFR BLD CALC: 43.6 % — SIGNIFICANT CHANGE UP (ref 39–50)
HGB BLD-MCNC: 14.3 G/DL — SIGNIFICANT CHANGE UP (ref 13–17)
HMPV RNA SPEC QL NAA+PROBE: SIGNIFICANT CHANGE UP
HPIV1 RNA SPEC QL NAA+PROBE: SIGNIFICANT CHANGE UP
HPIV2 RNA SPEC QL NAA+PROBE: SIGNIFICANT CHANGE UP
HPIV3 RNA SPEC QL NAA+PROBE: SIGNIFICANT CHANGE UP
HPIV4 RNA SPEC QL NAA+PROBE: SIGNIFICANT CHANGE UP
IANC: 11.79 K/UL — HIGH (ref 1.5–8.5)
LYMPHOCYTES # BLD AUTO: 1.48 K/UL — SIGNIFICANT CHANGE UP (ref 1–3.3)
LYMPHOCYTES # BLD AUTO: 8.7 % — LOW (ref 13–44)
M PNEUMO DNA SPEC QL NAA+PROBE: SIGNIFICANT CHANGE UP
MCHC RBC-ENTMCNC: 27.4 PG — SIGNIFICANT CHANGE UP (ref 27–34)
MCHC RBC-ENTMCNC: 32.8 GM/DL — SIGNIFICANT CHANGE UP (ref 32–36)
MCV RBC AUTO: 83.7 FL — SIGNIFICANT CHANGE UP (ref 80–100)
MONOCYTES # BLD AUTO: 1.93 K/UL — HIGH (ref 0–0.9)
MONOCYTES NFR BLD AUTO: 11.3 % — SIGNIFICANT CHANGE UP (ref 2–14)
NEUTROPHILS # BLD AUTO: 12.16 K/UL — HIGH (ref 1.8–7.4)
NEUTROPHILS NFR BLD AUTO: 70.4 % — SIGNIFICANT CHANGE UP (ref 43–77)
PLATELET # BLD AUTO: 423 K/UL — HIGH (ref 150–400)
POTASSIUM SERPL-MCNC: 4.7 MMOL/L — SIGNIFICANT CHANGE UP (ref 3.5–5.3)
POTASSIUM SERPL-SCNC: 4.7 MMOL/L — SIGNIFICANT CHANGE UP (ref 3.5–5.3)
PROT SERPL-MCNC: 7.5 G/DL — SIGNIFICANT CHANGE UP (ref 6–8.3)
RAPID RVP RESULT: SIGNIFICANT CHANGE UP
RBC # BLD: 5.21 M/UL — SIGNIFICANT CHANGE UP (ref 4.2–5.8)
RBC # FLD: 11.9 % — SIGNIFICANT CHANGE UP (ref 10.3–14.5)
RSV RNA SPEC QL NAA+PROBE: SIGNIFICANT CHANGE UP
RV+EV RNA SPEC QL NAA+PROBE: SIGNIFICANT CHANGE UP
SARS-COV-2 RNA SPEC QL NAA+PROBE: SIGNIFICANT CHANGE UP
SODIUM SERPL-SCNC: 137 MMOL/L — SIGNIFICANT CHANGE UP (ref 135–145)
SPECIMEN SOURCE: SIGNIFICANT CHANGE UP
WBC # BLD: 17.05 K/UL — HIGH (ref 3.8–10.5)
WBC # FLD AUTO: 17.05 K/UL — HIGH (ref 3.8–10.5)

## 2021-08-04 PROCEDURE — 93971 EXTREMITY STUDY: CPT | Mod: 26

## 2021-08-04 PROCEDURE — 70491 CT SOFT TISSUE NECK W/DYE: CPT | Mod: 26

## 2021-08-04 RX ORDER — DEXAMETHASONE 0.5 MG/5ML
10 ELIXIR ORAL ONCE
Refills: 0 | Status: COMPLETED | OUTPATIENT
Start: 2021-08-04 | End: 2021-08-04

## 2021-08-04 RX ORDER — IBUPROFEN 200 MG
600 TABLET ORAL ONCE
Refills: 0 | Status: COMPLETED | OUTPATIENT
Start: 2021-08-04 | End: 2021-08-04

## 2021-08-04 RX ORDER — SODIUM CHLORIDE 9 MG/ML
1000 INJECTION INTRAMUSCULAR; INTRAVENOUS; SUBCUTANEOUS
Refills: 0 | Status: DISCONTINUED | OUTPATIENT
Start: 2021-08-04 | End: 2021-08-06

## 2021-08-04 RX ORDER — KETOROLAC TROMETHAMINE 30 MG/ML
30 SYRINGE (ML) INJECTION EVERY 8 HOURS
Refills: 0 | Status: DISCONTINUED | OUTPATIENT
Start: 2021-08-04 | End: 2021-08-04

## 2021-08-04 RX ORDER — KETOROLAC TROMETHAMINE 30 MG/ML
30 SYRINGE (ML) INJECTION EVERY 6 HOURS
Refills: 0 | Status: DISCONTINUED | OUTPATIENT
Start: 2021-08-04 | End: 2021-08-05

## 2021-08-04 RX ORDER — DEXAMETHASONE 0.5 MG/5ML
10 ELIXIR ORAL EVERY 8 HOURS
Refills: 0 | Status: DISCONTINUED | OUTPATIENT
Start: 2021-08-04 | End: 2021-08-06

## 2021-08-04 RX ADMIN — SODIUM CHLORIDE 150 MILLILITER(S): 9 INJECTION INTRAMUSCULAR; INTRAVENOUS; SUBCUTANEOUS at 21:18

## 2021-08-04 RX ADMIN — Medication 102 MILLIGRAM(S): at 15:11

## 2021-08-04 RX ADMIN — Medication 102 MILLIGRAM(S): at 22:43

## 2021-08-04 RX ADMIN — Medication 100 MILLIGRAM(S): at 21:17

## 2021-08-04 RX ADMIN — Medication 600 MILLIGRAM(S): at 15:38

## 2021-08-04 RX ADMIN — Medication 600 MILLIGRAM(S): at 16:20

## 2021-08-04 NOTE — ED PROVIDER NOTE - OBJECTIVE STATEMENT
21 y/o male with pmhx of EBV, left soft tissue mass, presents to ED c/o worsening left neck swelling. Pt was admitted for 4.1cm left soft tissue neck mass concerning for abscess, given steroids and clindamycin. Pt states he followed up at ENT office today and sent to ER for drainage. Pt states he does not know which doctor he saw, poor historian. Temp of 100 at home today took Tylenol. No chills, sore throat, difficulty swallowing, cp, sob, abd pain, n/v.

## 2021-08-04 NOTE — ED ADULT TRIAGE NOTE - CHIEF COMPLAINT QUOTE
pt discharged home two days ago for peritonsillar abscess c.o unrelieved left sided neck swelling, fever tmax 100. Airway patent. RR even and unlabored. No PMH

## 2021-08-04 NOTE — ED PROVIDER NOTE - ENMT, MLM
Airway patent, Nasal mucosa clear. Mouth with normal mucosa. Throat has no vesicles, no oropharyngeal exudates and uvula is midline. No PTA. +left soft tissue swelling, firm, tender, minimal erythema. No streaking, no crepitus. 6cm x 4cm

## 2021-08-04 NOTE — ED CDU PROVIDER INITIAL DAY NOTE - OBJECTIVE STATEMENT
19 yo M with recent EBV a/w L soft tissue mass and L neck swelling.  Pt was admitted for 4.1cm left soft tissue neck mass concerning for abscess, given steroids and clindamycin. Pt states he followed up at ENT office today and sent to ER for drainage. Temp of 100 at home today took Tylenol. No chills, sore throat, difficulty swallowing, cp, sob, abd pain, n/v.

## 2021-08-04 NOTE — CONSULT NOTE ADULT - ASSESSMENT
20yoM history of mono diagnosed 3 weeks ago, now with suppurative lymphadenitis of left level II LN. He had some improvement over the weekend while he was admitted with decadron and clindamycin, now returning for persistent pain.     -CT neck reviewed with Dr. Lincoln. Findings likely more consistent with suppurative lymph nodes from known mono infection and less likely abscess that requires immediate I&D  -Will likely continue to improve from supportive care  -Recommend 10mg decadron now. Will re-assess in a couple hours. If doing well can d/c on medrol dosepak with plan for follow-up with Dr. Lincoln next week  -OK for diet  -Please page with questions  -Seen and d/w attending

## 2021-08-04 NOTE — ED PROVIDER NOTE - ATTENDING CONTRIBUTION TO CARE
Gong: I have seen and examined the patient face to face, have reviewed and addended the HPI, PE and a/p as necessary.     19 yo M with recent EBV a/w L soft tissue mass and L neck swelling.  Pt was admitted for 4.1cm left soft tissue neck mass concerning for abscess, given steroids and clindamycin. Pt states he followed up at ENT office today and sent to ER for drainage. Temp of 100 at home today took Tylenol. No chills, sore throat, difficulty swallowing, cp, sob, abd pain, n/v.    GEN - NAD; well appearing; A+O x3; non-toxic appearing  HEENT - Airway patent, Nasal mucosa clear. Mouth with normal mucosa. Throat has no vesicles, no oropharyngeal exudates and uvula is midline. No PTA. +left soft tissue swelling, firm, tender, minimal erythema. No streaking, no crepitus. 6cm x 4cm  CARD -s1s2, RRR, no M,G,R;   PULM - CTA b/l, symmetric breath sounds;   ABD -  +BS, ND, NT, soft, no guarding, no rebound, no masses;   BACK - no CVA tenderness, Normal  spine;   EXT - symmetric pulses, 2+ dp, capillary refill < 2 seconds, no cyanosis, no edema;   NEURO - no focal neuro deficits, no slurred speech    19 y/o male with pmhx of EBV, left soft tissue mass, presents to ED c/o worsening left neck swelling. Pt was admitted for 4.1cm left soft tissue neck mass concerning for abscess, given steroids and clindamycin. Noted to have worsening swelling and fever to 100.  Will repeat ct scan; ENT consult, and re-eval.

## 2021-08-04 NOTE — ED CDU PROVIDER INITIAL DAY NOTE - MEDICAL DECISION MAKING DETAILS
19 y/o male with pmhx of EBV, left soft tissue mass, presents to ED c/o worsening left neck swelling. Pt was admitted for 4.1cm left soft tissue neck mass concerning for abscess, given steroids and clindamycin. Noted to have worsening swelling and fever to 100.  Seen by ent recommending steroids and re-eval; noted to have compression of L IJ, will eval with us. 21 y/o male with pmhx of EBV, left soft tissue mass, presents to ED c/o worsening left neck swelling. Pt was admitted for 4.1cm left soft tissue neck mass concerning for abscess, given steroids and clindamycin. Noted to have worsening swelling and fever to 100.  Seen by ent recommending steroids and re-eval; noted to have compression of L IJ, will eval with us.  Pain control, will hold on abx given likely lymphadenopathy.

## 2021-08-04 NOTE — ED CDU PROVIDER INITIAL DAY NOTE - PHYSICAL EXAMINATION
GEN - NAD; well appearing; A+O x3; non-toxic appearing  HEENT - Airway patent, Nasal mucosa clear. Mouth with normal mucosa. Throat has no vesicles, no oropharyngeal exudates and uvula is midline. No PTA. +left soft tissue swelling, firm, tender, minimal erythema. No streaking, no crepitus. 6cm x 4cm  CARD -s1s2, RRR, no M,G,R;   PULM - CTA b/l, symmetric breath sounds;   ABD -  +BS, ND, NT, soft, no guarding, no rebound, no masses;   BACK - no CVA tenderness, Normal  spine;   EXT - symmetric pulses, 2+ dp, capillary refill < 2 seconds, no cyanosis, no edema;   NEURO - no focal neuro deficits, no slurred speech

## 2021-08-04 NOTE — CONSULT NOTE ADULT - SUBJECTIVE AND OBJECTIVE BOX
HPI:    Patient is a 20y Male who recently had mono three weeks ago. He was admitted over the weekend to ENT team with worsening neck pain and swelling. Outside CT scan from Friday appeared to show 4cm hypoattenuation consistent with lymphadenitis. He was given steroids and clindamycin over the weekend and was improving. He was discharged Monday on clindamycin but came back to ED today with persistent pain.     He says that overall his pain is better than Friday but is concerned that swelling has not improved. Repeat CT scan today appears to show more well-defined suppurative lymph nodes. He is afebrile and denies fevers, chills, dysphagia, shortness of breath.     Physical Exam    T(C): 36.3 (08-04-21 @ 11:58), Max: 36.3 (08-04-21 @ 11:58)  HR: 96 (08-04-21 @ 11:58) (96 - 96)  BP: 120/75 (08-04-21 @ 11:58) (120/75 - 120/75)  RR: 16 (08-04-21 @ 11:58) (16 - 16)  SpO2: 100% (08-04-21 @ 11:58) (100% - 100%)    General: NAD, A+Ox3  No respiratory distress, stridor, or stertor  Voice quality: normal  Face:  Symmetric without masses or lesions  OU: PERRL, EOMI  Nose: nasal cavity clear bilaterally, inferior turbinates normal, mucosa normal without crusting or bleeding  OC/OP: tongue normal, floor of mouth wnl, no masses or lesions, OP clear  Neck: Left level II neck swelling, firm tender to touch. No overlying skin changes  CNII-XII intact    CT NECK -     IMPRESSION:    Larger left level 2 abscess versus necrotic lymph node or tumor now measuring 4.6 x 3.7 x 2.8 cm and 2.0 x 2.3 x 2.5 cm. There are now multiple fluid collections. There is ipsilateral left-sided adenopathy appearing up to 2.7 cm in greatest dimension by 1.0 x 1.2 cm.    Occlusion of the left internal jugular vein at the level of the left neck lesion again noted, likely due to external compression. Cannot exclude clot.    Discussed with Dr. Jain in the ED at 2:58 PM. There is a h/o mono.

## 2021-08-04 NOTE — ED ADULT NURSE NOTE - OBJECTIVE STATEMENT
patient Alert & ox3, pt discharged home two days ago for peritonsillar abscess , complains of unrelieved left sided neck swelling and fever . pt . evaluated by MD.Placed 20g angiocath rt. AC., labs drawn and sent. IVF ns started and due meds given as per order. patient will be waiting for results, further evaluation and disposition.  made comfortable.will continue to monitor.

## 2021-08-04 NOTE — ED PROVIDER NOTE - CLINICAL SUMMARY MEDICAL DECISION MAKING FREE TEXT BOX
19 y/o male with pmhx of EBV, left soft tissue mass, presents to ED c/o worsening left neck swelling. Pt was admitted for 4.1cm left soft tissue neck mass concerning for abscess, given steroids and clindamycin. Pt states he followed up at ENT office today and sent to ER for drainage. Pt states he does not know which doctor he saw, poor historian. Temp of 100 at home today took Tylenol. No chills, sore throat, difficulty swallowing, cp, sob, abd pain, n/v. on exam pt with 6cm x 4cm left soft tissue mass, firm, tender, minimal erythema, no streaking no crepitus. plan to check labs, blood cx, CT neck soft tissue with IV contrast, consult ENT, pain well controlled deferring pain meds at this time took tylenol prior

## 2021-08-04 NOTE — ED CDU PROVIDER INITIAL DAY NOTE - NS ED ROS FT
REVIEW OF SYSTEMS:     CONSTITUTIONAL:  + fever, no weight loss, or fatigue  EYES: No eye pain, visual disturbances, or discharge  ENMT:  + neck swelling, No difficulty hearing, tinnitus, vertigo; No sinus or throat pain  NECK: + pain, stiffness  RESPIRATORY: No cough, wheezing, chills or hemoptysis; No shortness of breath  CARDIOVASCULAR: No chest pain, palpitations, dizziness, or leg swelling  GASTROINTESTINAL: No abdominal or epigastric pain. No nausea, vomiting, or hematemesis; No diarrhea or constipation. No melena or hematochezia.  GENITOURINARY: No dysuria, frequency, hematuria, or incontinence  NEUROLOGICAL: No headaches, memory loss, loss of strength, numbness, or tremors  SKIN: No itching, burning, rashes, or lesions

## 2021-08-05 DIAGNOSIS — R22.1 LOCALIZED SWELLING, MASS AND LUMP, NECK: ICD-10-CM

## 2021-08-05 LAB
BASOPHILS # BLD AUTO: 0.02 K/UL — SIGNIFICANT CHANGE UP (ref 0–0.2)
BASOPHILS NFR BLD AUTO: 0.2 % — SIGNIFICANT CHANGE UP (ref 0–2)
EOSINOPHIL # BLD AUTO: 0.01 K/UL — SIGNIFICANT CHANGE UP (ref 0–0.5)
EOSINOPHIL NFR BLD AUTO: 0.1 % — SIGNIFICANT CHANGE UP (ref 0–6)
HCT VFR BLD CALC: 39.6 % — SIGNIFICANT CHANGE UP (ref 39–50)
HGB BLD-MCNC: 13.3 G/DL — SIGNIFICANT CHANGE UP (ref 13–17)
IANC: 10.64 K/UL — HIGH (ref 1.5–8.5)
IMM GRANULOCYTES NFR BLD AUTO: 0.9 % — SIGNIFICANT CHANGE UP (ref 0–1.5)
LYMPHOCYTES # BLD AUTO: 1.2 K/UL — SIGNIFICANT CHANGE UP (ref 1–3.3)
LYMPHOCYTES # BLD AUTO: 9.8 % — LOW (ref 13–44)
MCHC RBC-ENTMCNC: 27.7 PG — SIGNIFICANT CHANGE UP (ref 27–34)
MCHC RBC-ENTMCNC: 33.6 GM/DL — SIGNIFICANT CHANGE UP (ref 32–36)
MCV RBC AUTO: 82.3 FL — SIGNIFICANT CHANGE UP (ref 80–100)
MONOCYTES # BLD AUTO: 0.32 K/UL — SIGNIFICANT CHANGE UP (ref 0–0.9)
MONOCYTES NFR BLD AUTO: 2.6 % — SIGNIFICANT CHANGE UP (ref 2–14)
NEUTROPHILS # BLD AUTO: 10.64 K/UL — HIGH (ref 1.8–7.4)
NEUTROPHILS NFR BLD AUTO: 86.4 % — HIGH (ref 43–77)
NRBC # BLD: 0 /100 WBCS — SIGNIFICANT CHANGE UP
NRBC # FLD: 0 K/UL — SIGNIFICANT CHANGE UP
PLATELET # BLD AUTO: 420 K/UL — HIGH (ref 150–400)
RBC # BLD: 4.81 M/UL — SIGNIFICANT CHANGE UP (ref 4.2–5.8)
RBC # FLD: 11.7 % — SIGNIFICANT CHANGE UP (ref 10.3–14.5)
WBC # BLD: 12.3 K/UL — HIGH (ref 3.8–10.5)
WBC # FLD AUTO: 12.3 K/UL — HIGH (ref 3.8–10.5)

## 2021-08-05 RX ORDER — POLYETHYLENE GLYCOL 3350 17 G/17G
17 POWDER, FOR SOLUTION ORAL DAILY
Refills: 0 | Status: DISCONTINUED | OUTPATIENT
Start: 2021-08-05 | End: 2021-08-06

## 2021-08-05 RX ORDER — OXYCODONE HYDROCHLORIDE 5 MG/1
10 TABLET ORAL EVERY 6 HOURS
Refills: 0 | Status: DISCONTINUED | OUTPATIENT
Start: 2021-08-05 | End: 2021-08-06

## 2021-08-05 RX ORDER — OXYCODONE HYDROCHLORIDE 5 MG/1
5 TABLET ORAL EVERY 6 HOURS
Refills: 0 | Status: DISCONTINUED | OUTPATIENT
Start: 2021-08-05 | End: 2021-08-06

## 2021-08-05 RX ORDER — SENNA PLUS 8.6 MG/1
1 TABLET ORAL AT BEDTIME
Refills: 0 | Status: DISCONTINUED | OUTPATIENT
Start: 2021-08-05 | End: 2021-08-06

## 2021-08-05 RX ADMIN — Medication 100 MILLIGRAM(S): at 22:36

## 2021-08-05 RX ADMIN — Medication 30 MILLIGRAM(S): at 01:20

## 2021-08-05 RX ADMIN — Medication 30 MILLIGRAM(S): at 00:46

## 2021-08-05 RX ADMIN — SODIUM CHLORIDE 150 MILLILITER(S): 9 INJECTION INTRAMUSCULAR; INTRAVENOUS; SUBCUTANEOUS at 07:06

## 2021-08-05 RX ADMIN — Medication 100 MILLIGRAM(S): at 06:21

## 2021-08-05 RX ADMIN — Medication 102 MILLIGRAM(S): at 23:33

## 2021-08-05 RX ADMIN — Medication 100 MILLIGRAM(S): at 14:16

## 2021-08-05 RX ADMIN — Medication 102 MILLIGRAM(S): at 07:06

## 2021-08-05 RX ADMIN — Medication 102 MILLIGRAM(S): at 14:47

## 2021-08-05 NOTE — ED CDU PROVIDER SUBSEQUENT DAY NOTE - HISTORY
patient with EBV presenting with suppurative lymphadenitis of the neck accepted to CDU for IV abx, and steroids. in interim, patient resting comfortable with no acute complaints. patient pending ENT reeval in AM

## 2021-08-05 NOTE — PRE PROCEDURE NOTE - PRE PROCEDURE EVALUATION
Patient is a 19 yo M with mono diagnosed 3-4 weeks ago now with suppurative lymphadenitis not improving with conservative medical management. Worsening collection on most recent CT

## 2021-08-05 NOTE — ED CDU PROVIDER DISPOSITION NOTE - CLINICAL COURSE
21 y/o male with supp lymphadenitis.  CDU for ent eval and abx.  Admitted to ent service for IR drainage.

## 2021-08-05 NOTE — ED CDU PROVIDER SUBSEQUENT DAY NOTE - PROGRESS NOTE DETAILS
Pt seen by ENT, advising of plan of likely drainage with IR, advised to admit to Dr. Lincoln. Pt stable, no acute distress. Pt has no complaints.   Easy wob.  Pending ent eval for likely admission.

## 2021-08-05 NOTE — ED CDU PROVIDER SUBSEQUENT DAY NOTE - ATTENDING CONTRIBUTION TO CARE
CDU MD LAMBERT:  I performed a face to face bedside interview with patient regarding history of present illness, review of symptoms and past medical history. I completed an independent physical exam.  I have discussed patient's plan of care with PA.   I agree with note as stated above, having amended the EMR as needed to reflect my findings. I have discussed the assessment and plan of care.  This includes during the time I functioned as the attending physician for this patient.

## 2021-08-05 NOTE — CHART NOTE - NSCHARTNOTEFT_GEN_A_CORE
case was approved by DR. Worthy for aspiration of left neck necrotic lymph node this AM, however order for IR procedure was never placed.    plan for  above procedure tomorrow 8/6 . can put order for IR procedure under Dr. Worthy  will perform under local, no need for NPO  please recheck CBC BMP Coags 4AM

## 2021-08-06 ENCOUNTER — TRANSCRIPTION ENCOUNTER (OUTPATIENT)
Age: 21
End: 2021-08-06

## 2021-08-06 ENCOUNTER — RESULT REVIEW (OUTPATIENT)
Age: 21
End: 2021-08-06

## 2021-08-06 VITALS
DIASTOLIC BLOOD PRESSURE: 70 MMHG | SYSTOLIC BLOOD PRESSURE: 123 MMHG | HEART RATE: 74 BPM | OXYGEN SATURATION: 100 % | TEMPERATURE: 98 F | RESPIRATION RATE: 16 BRPM

## 2021-08-06 LAB
ANION GAP SERPL CALC-SCNC: 10 MMOL/L — SIGNIFICANT CHANGE UP (ref 7–14)
APTT BLD: 26.3 SEC — LOW (ref 27–36.3)
BUN SERPL-MCNC: 12 MG/DL — SIGNIFICANT CHANGE UP (ref 7–23)
CALCIUM SERPL-MCNC: 9 MG/DL — SIGNIFICANT CHANGE UP (ref 8.4–10.5)
CHLORIDE SERPL-SCNC: 105 MMOL/L — SIGNIFICANT CHANGE UP (ref 98–107)
CO2 SERPL-SCNC: 21 MMOL/L — LOW (ref 22–31)
COVID-19 SPIKE DOMAIN AB INTERP: POSITIVE
COVID-19 SPIKE DOMAIN ANTIBODY RESULT: >250 U/ML — HIGH
CREAT SERPL-MCNC: 0.65 MG/DL — SIGNIFICANT CHANGE UP (ref 0.5–1.3)
GLUCOSE SERPL-MCNC: 140 MG/DL — HIGH (ref 70–99)
HCT VFR BLD CALC: 37.2 % — LOW (ref 39–50)
HGB BLD-MCNC: 12.4 G/DL — LOW (ref 13–17)
INR BLD: 1.12 RATIO — SIGNIFICANT CHANGE UP (ref 0.88–1.16)
MAGNESIUM SERPL-MCNC: 2.1 MG/DL — SIGNIFICANT CHANGE UP (ref 1.6–2.6)
MCHC RBC-ENTMCNC: 27.5 PG — SIGNIFICANT CHANGE UP (ref 27–34)
MCHC RBC-ENTMCNC: 33.3 GM/DL — SIGNIFICANT CHANGE UP (ref 32–36)
MCV RBC AUTO: 82.5 FL — SIGNIFICANT CHANGE UP (ref 80–100)
NIGHT BLUE STAIN TISS: SIGNIFICANT CHANGE UP
NRBC # BLD: 0 /100 WBCS — SIGNIFICANT CHANGE UP
NRBC # FLD: 0 K/UL — SIGNIFICANT CHANGE UP
PHOSPHATE SERPL-MCNC: 3.1 MG/DL — SIGNIFICANT CHANGE UP (ref 2.5–4.5)
PLATELET # BLD AUTO: 453 K/UL — HIGH (ref 150–400)
POTASSIUM SERPL-MCNC: 4.5 MMOL/L — SIGNIFICANT CHANGE UP (ref 3.5–5.3)
POTASSIUM SERPL-SCNC: 4.5 MMOL/L — SIGNIFICANT CHANGE UP (ref 3.5–5.3)
PROTHROM AB SERPL-ACNC: 12.7 SEC — SIGNIFICANT CHANGE UP (ref 10.6–13.6)
RBC # BLD: 4.51 M/UL — SIGNIFICANT CHANGE UP (ref 4.2–5.8)
RBC # FLD: 11.9 % — SIGNIFICANT CHANGE UP (ref 10.3–14.5)
SARS-COV-2 IGG+IGM SERPL QL IA: >250 U/ML — HIGH
SARS-COV-2 IGG+IGM SERPL QL IA: POSITIVE
SODIUM SERPL-SCNC: 136 MMOL/L — SIGNIFICANT CHANGE UP (ref 135–145)
SPECIMEN SOURCE: SIGNIFICANT CHANGE UP
WBC # BLD: 21.8 K/UL — HIGH (ref 3.8–10.5)
WBC # FLD AUTO: 21.8 K/UL — HIGH (ref 3.8–10.5)

## 2021-08-06 PROCEDURE — 10160 PNXR ASPIR ABSC HMTMA BULLA: CPT

## 2021-08-06 PROCEDURE — 76942 ECHO GUIDE FOR BIOPSY: CPT | Mod: 26

## 2021-08-06 PROCEDURE — 88305 TISSUE EXAM BY PATHOLOGIST: CPT | Mod: 26

## 2021-08-06 RX ADMIN — Medication 100 MILLIGRAM(S): at 05:13

## 2021-08-06 RX ADMIN — Medication 102 MILLIGRAM(S): at 05:15

## 2021-08-06 NOTE — DISCHARGE NOTE NURSING/CASE MANAGEMENT/SOCIAL WORK - NSDCPNINST_GEN_ALL_CORE
Please follow up with your physician within a week. Please take full course of prescribed antibiotics and steroids. Please resume regular diet and activity as tolerated.

## 2021-08-06 NOTE — PROCEDURE NOTE - PROCEDURE FINDINGS AND DETAILS
Status post left neck collection aspiration, yielding 3 cc of tan purulent fluid  no acute complications

## 2021-08-06 NOTE — DISCHARGE NOTE NURSING/CASE MANAGEMENT/SOCIAL WORK - PATIENT PORTAL LINK FT
You can access the FollowMyHealth Patient Portal offered by [facility] by registering at the following website: [email or web address]. By joining [facility]’s FollowMyHealth portal, you will also be able to view your health information using other applications (apps) compatible with our system.

## 2021-08-06 NOTE — PROGRESS NOTE ADULT - SUBJECTIVE AND OBJECTIVE BOX
ENT PROGRESS NOTE    Patient is a 20y Male who recently had mono three weeks ago. He was admitted over the weekend to ENT team with worsening neck pain and swelling. Outside CT scan from Friday appeared to show 4cm hypoattenuation consistent with lymphadenitis. He was given steroids and clindamycin over the weekend and was improving. He was discharged Monday on clindamycin but came back to ED today with persistent pain.     INTERVAL:    8/5: Overall doing mildly better with pain and swelling. Tolerating PO, no issues breathing.    ICU Vital Signs Last 24 Hrs  T(C): 36.7 (05 Aug 2021 05:24), Max: 37.1 (04 Aug 2021 15:31)  T(F): 98.1 (05 Aug 2021 05:24), Max: 98.8 (04 Aug 2021 15:31)  HR: 71 (05 Aug 2021 05:24) (70 - 96)  BP: 106/68 (05 Aug 2021 05:24) (100/64 - 120/75)  BP(mean): --  ABP: --  ABP(mean): --  RR: 16 (05 Aug 2021 05:24) (16 - 17)  SpO2: 98% (05 Aug 2021 05:24) (98% - 100%)    General: NAD, A+Ox3  No respiratory distress, stridor, or stertor  Voice quality: normal  Face:  Symmetric without masses or lesions  OU: PERRL, EOMI  Nose: nasal cavity clear bilaterally, inferior turbinates normal, mucosa normal without crusting or bleeding  OC/OP: tongue normal, floor of mouth wnl, no masses or lesions, OP clear  Neck: Left level II neck swelling, firm tender to touch. No overlying skin changes  CNII-XII intact    CT NECK -     IMPRESSION:    Larger left level 2 abscess versus necrotic lymph node or tumor now measuring 4.6 x 3.7 x 2.8 cm and 2.0 x 2.3 x 2.5 cm. There are now multiple fluid collections. There is ipsilateral left-sided adenopathy appearing up to 2.7 cm in greatest dimension by 1.0 x 1.2 cm.    Occlusion of the left internal jugular vein at the level of the left neck lesion again noted, likely due to external compression. Cannot exclude clot.        20yoM history of mono diagnosed 3 weeks ago, now with suppurative lymphadenitis of left level II LN. He had some improvement over the weekend while he was admitted with decadron and clindamycin, now returning for persistent pain.     -Pt not improving significantly, with elevated WBC, will consult IR for drainage  -OK for diet  -Please page with questions  -Seen and d/w attending         
ENT PROGRESS NOTE    Patient is a 20y Male who recently had mono three weeks ago. He was admitted over the weekend to ENT team with worsening neck pain and swelling. Outside CT scan from Friday appeared to show 4cm hypoattenuation consistent with lymphadenitis. He was given steroids and clindamycin over the weekend and was improving. He was discharged Monday on clindamycin but came back to ED today with persistent pain.     INTERVAL:    8/5: Overall doing mildly better with pain and swelling. Tolerating PO, no issues breathing.  8/6: NAEON, improving pain and swelling - for IR drainage today.    ICU Vital Signs Last 24 Hrs  T(C): 36.6 (06 Aug 2021 05:17), Max: 36.8 (05 Aug 2021 21:57)  T(F): 97.8 (06 Aug 2021 05:17), Max: 98.3 (05 Aug 2021 21:57)  HR: 65 (06 Aug 2021 05:17) (65 - 102)  BP: 108/65 (06 Aug 2021 05:17) (108/65 - 137/73)  BP(mean): --  ABP: --  ABP(mean): --  RR: 16 (06 Aug 2021 05:17) (16 - 18)  SpO2: 98% (06 Aug 2021 05:17) (97% - 99%)      General: NAD, A+Ox3  No respiratory distress, stridor, or stertor  Voice quality: normal  Face:  Symmetric without masses or lesions  OU: PERRL, EOMI  Nose: nasal cavity clear bilaterally, inferior turbinates normal, mucosa normal without crusting or bleeding  OC/OP: tongue normal, floor of mouth wnl, no masses or lesions, OP clear  Neck: Left level II neck swelling, firm tender to touch. No overlying skin changes  CNII-XII intact    CT NECK -     IMPRESSION:    Larger left level 2 abscess versus necrotic lymph node or tumor now measuring 4.6 x 3.7 x 2.8 cm and 2.0 x 2.3 x 2.5 cm. There are now multiple fluid collections. There is ipsilateral left-sided adenopathy appearing up to 2.7 cm in greatest dimension by 1.0 x 1.2 cm.    Occlusion of the left internal jugular vein at the level of the left neck lesion again noted, likely due to external compression. Cannot exclude clot.        20yoM history of mono diagnosed 3 weeks ago, now with suppurative lymphadenitis of left level II LN. He had some improvement over the weekend while he was admitted with decadron and clindamycin, now returning for persistent pain.     -Pt not improving significantly, with elevated WBC  - IR drainage today  -OK for diet  -Please page with questions  -Seen and d/w attending

## 2021-08-06 NOTE — DISCHARGE NOTE PROVIDER - HOSPITAL COURSE
20yoM history of recent mono infection presents for neck mass/swelling. Was admitted last weekend, improving initially on steroids and antibiotics. Readmitted for worsening pain/swelling. CT neck showing suppurative lymphadenitis. Patient underwent IR drainage of neck mass on 8/6. Tolerated well without complication. He was found to be doing well so was discharged on antibiotics and steroids with plan for close follow-up with Dr. Lincoln.

## 2021-08-06 NOTE — DISCHARGE NOTE PROVIDER - NSDCMRMEDTOKEN_GEN_ALL_CORE_FT
clindamycin 300 mg oral capsule: 1 cap(s) orally every 6 hours   Medrol Dosepak 4 mg oral tablet: Take steroid taper as instructed on box/packaging.

## 2021-08-06 NOTE — DISCHARGE NOTE PROVIDER - CARE PROVIDER_API CALL
Damon Lincoln)  Otolaryngology  [address] [place] [address]  Phone: [phone]  Fax: [phone]  Follow Up Time:

## 2021-08-06 NOTE — DISCHARGE NOTE PROVIDER - NSDCFUADDINST_GEN_ALL_CORE_FT
-Take antibiotics for 7 days   -Take Medrol Dosepak as instructed   -Follow-up with Dr. Lincoln in one week  -Please call office with questions or concerns - [phone]

## 2021-08-06 NOTE — DISCHARGE NOTE PROVIDER - NSDCCPCAREPLAN_GEN_ALL_CORE_FT
PRINCIPAL DISCHARGE DIAGNOSIS  Diagnosis: Neck mass  Assessment and Plan of Treatment:       
denies

## 2021-08-09 ENCOUNTER — APPOINTMENT (OUTPATIENT)
Dept: OTOLARYNGOLOGY | Facility: CLINIC | Age: 21
End: 2021-08-09
Payer: COMMERCIAL

## 2021-08-09 ENCOUNTER — APPOINTMENT (OUTPATIENT)
Dept: OTOLARYNGOLOGY | Facility: CLINIC | Age: 21
End: 2021-08-09

## 2021-08-09 VITALS
WEIGHT: 185 LBS | OXYGEN SATURATION: 98 % | BODY MASS INDEX: 25.06 KG/M2 | HEIGHT: 72 IN | DIASTOLIC BLOOD PRESSURE: 79 MMHG | HEART RATE: 76 BPM | SYSTOLIC BLOOD PRESSURE: 124 MMHG

## 2021-08-09 LAB
CULTURE RESULTS: SIGNIFICANT CHANGE UP
CULTURE RESULTS: SIGNIFICANT CHANGE UP
NON-GYNECOLOGICAL CYTOLOGY STUDY: SIGNIFICANT CHANGE UP
SPECIMEN SOURCE: SIGNIFICANT CHANGE UP
SPECIMEN SOURCE: SIGNIFICANT CHANGE UP

## 2021-08-09 PROCEDURE — 99214 OFFICE O/P EST MOD 30 MIN: CPT

## 2021-08-09 NOTE — PHYSICAL EXAM
[de-identified] : LAD in left level II/III/V.  Mobile, firm, no TTP.   [Midline] : trachea located in midline position [Normal] : no rashes

## 2021-08-09 NOTE — HISTORY OF PRESENT ILLNESS
[de-identified] : pt hx of Mono 3 weeks ago and left neck lumps was swollen more and presents to ED on 8/4/2021, ct of neck done Larger left level 2 abscess versus necrotic lymph node or tumor now measuring 4.6 x 3.7 x 2.8 cm and 2.0 x 2.3 x 2.5 cm. There are now multiple fluid collections. There is ipsilateral left-sided adenopathy appearing up to 2.7 cm in greatest dimension by 1.0 x 1.2 cm. On 8/6/2021 for lymphadenitis and left neck mass was drained at IR  and path NEGATIVE FOR MALIGNANT CELLS. Consistent with Abscess. pt is feeling better and some what better, but still there, no pain, no fever, no dysphagia. pt is still on clindamycin and steroids.

## 2021-08-11 LAB
CULTURE RESULTS: SIGNIFICANT CHANGE UP
SPECIMEN SOURCE: SIGNIFICANT CHANGE UP

## 2021-08-20 ENCOUNTER — APPOINTMENT (OUTPATIENT)
Dept: OTOLARYNGOLOGY | Facility: CLINIC | Age: 21
End: 2021-08-20
Payer: COMMERCIAL

## 2021-08-20 VITALS
SYSTOLIC BLOOD PRESSURE: 125 MMHG | TEMPERATURE: 97.1 F | WEIGHT: 185 LBS | OXYGEN SATURATION: 97 % | HEART RATE: 74 BPM | BODY MASS INDEX: 25.06 KG/M2 | HEIGHT: 72 IN | DIASTOLIC BLOOD PRESSURE: 81 MMHG

## 2021-08-20 PROCEDURE — 99214 OFFICE O/P EST MOD 30 MIN: CPT

## 2021-08-20 RX ORDER — METHYLPREDNISOLONE 4 MG/1
4 TABLET ORAL
Qty: 1 | Refills: 1 | Status: COMPLETED | COMMUNITY
Start: 2021-07-19 | End: 2021-08-20

## 2021-08-30 NOTE — HISTORY OF PRESENT ILLNESS
[de-identified] : pt hx of Mono 1.5 months ago and left neck lumps was swollen more and presents to ED on 8/4/2021, ct of neck done Larger left level 2 abscess versus necrotic lymph node or tumor now measuring 4.6 x 3.7 x 2.8 cm and 2.0 x 2.3 x 2.5 cm. There were multiple fluid collections. There is ipsilateral left-sided adenopathy appearing up to 2.7 cm in greatest dimension by 1.0 x 1.2 cm. On 8/6/2021 for lymphadenitis and left neck mass was drained at IR  and path NEGATIVE FOR MALIGNANT CELLS. Consistent with Abscess.  \par  Pt is here today because the mass is enlarging again. Denies fever.

## 2021-08-30 NOTE — PHYSICAL EXAM
[Midline] : trachea located in midline position [Normal] : no rashes [de-identified] : LAD in left level II/III/V.  Mobile, firm, no TTP.  Unchanged from last visit.

## 2021-09-04 LAB
CULTURE RESULTS: SIGNIFICANT CHANGE UP
SPECIMEN SOURCE: SIGNIFICANT CHANGE UP

## 2021-09-13 ENCOUNTER — APPOINTMENT (OUTPATIENT)
Dept: OTOLARYNGOLOGY | Facility: CLINIC | Age: 21
End: 2021-09-13
Payer: COMMERCIAL

## 2021-09-13 VITALS
SYSTOLIC BLOOD PRESSURE: 118 MMHG | WEIGHT: 185 LBS | HEART RATE: 73 BPM | DIASTOLIC BLOOD PRESSURE: 81 MMHG | TEMPERATURE: 97.1 F | HEIGHT: 72 IN | BODY MASS INDEX: 25.06 KG/M2 | OXYGEN SATURATION: 98 %

## 2021-09-13 DIAGNOSIS — R59.1 GENERALIZED ENLARGED LYMPH NODES: ICD-10-CM

## 2021-09-13 DIAGNOSIS — B27.90 INFECTIOUS MONONUCLEOSIS, UNSPECIFIED W/OUT COMPLICATION: ICD-10-CM

## 2021-09-13 PROCEDURE — 99214 OFFICE O/P EST MOD 30 MIN: CPT

## 2021-09-13 NOTE — HISTORY OF PRESENT ILLNESS
[de-identified] : Patient with infectious mononucleosis and left cervical lymphadenitis.  He underwent US guided aspiration of the LNs and pathology findings are consistent. He finished the course of antibiotics and his steroid taper. pt is doing well, no pain no dysphagia. pt barely feels the lymph nodes.  sophie

## 2021-09-13 NOTE — PHYSICAL EXAM
[de-identified] : LAD in the left neck is significantly reduced.  Now with approx. 2.5 cm firm LAD in left level II.  Mobile, firm, no TTP. [Midline] : trachea located in midline position [Normal] : no rashes

## 2021-09-25 LAB
CULTURE RESULTS: SIGNIFICANT CHANGE UP
SPECIMEN SOURCE: SIGNIFICANT CHANGE UP

## 2022-11-18 NOTE — ED CDU PROVIDER DISPOSITION NOTE - ATTENDING CONTRIBUTION TO CARE
Onset 4 days ago, pt reports not getting any better.  Pt reports green phlegm, but denies cough, fever, chills, body aches, headache, wheezing, chest pain or SOB.   Pt insists on oral antibiotic, stated penicillin products do not work  He does have a history of asthma, using his inhaler more lately  He did not take a covid test and refuses to take.  He states he knows it's not covid.  I attempted to explain that his duration of symptoms are likely due to a virus, pt states he knows his body and it's bacterial.  He listened to reasons, but is insistent about an antibiotic today.  We discussed implications of possibly receiving an antibiotic medication for an acute viral.  He voiced understanding and still demands to have an antibiotic.  Since he has a hx of asthma, will empirically tx azithromycin x5 days  Supportive care- fluids, rest, OTC mucinex, tylenol/ibuprofen.  He should return to clinic or immediate care if his symptoms do not improve or worsen after 1 week.    CDU MD LAMBERT:  I performed a face to face bedside interview with patient regarding history of present illness, review of symptoms and past medical history. I completed an independent physical exam.  I have discussed patient's plan of care with PA.   I agree with note as stated above, having amended the EMR as needed to reflect my findings. I have discussed the assessment and plan of care.  This includes during the time I functioned as the attending physician for this patient.

## 2023-01-04 NOTE — PATIENT PROFILE ADULT - SAFE PLACE TO LIVE
1/4/23: second request from [facility] for wound information for December. Patient was not seen in the [facility] and form was refaxed to Patient's Home Health agency for completion.  
Fax received from [employer] for wound care supplies for Patient with wound documentation 30 days prior to 12/19/22. Last visit to [facility] was in October.   Call out to [facility] to discuss wound assessment; orders for supplies.    Next appointment 1/10/23 with OLE YEAGER  
Return call from Home health; information was faxed to them for completion and they will complete and fax to [facility].   
no

## 2023-09-18 NOTE — DISCHARGE NOTE NURSING/CASE MANAGEMENT/SOCIAL WORK - NSDCPNINST_GEN_ALL_CORE
Report of any redness, drainage, swelling, fever or pain not relieved by pain medication. Report difficultly swallowing and increased work of breathing to the provider.
fair balance

## 2025-01-27 NOTE — PATIENT PROFILE ADULT - DEAF OR HARD OF HEARING?
Was the patient seen in the last year in this department? Yes   Does patient have an active prescription for medications requested? No   Received Request Via: Patient                 no